# Patient Record
Sex: MALE | Race: OTHER | HISPANIC OR LATINO
[De-identification: names, ages, dates, MRNs, and addresses within clinical notes are randomized per-mention and may not be internally consistent; named-entity substitution may affect disease eponyms.]

---

## 2024-01-08 PROBLEM — Z00.00 ENCOUNTER FOR PREVENTIVE HEALTH EXAMINATION: Status: ACTIVE | Noted: 2024-01-08

## 2024-01-30 ENCOUNTER — NON-APPOINTMENT (OUTPATIENT)
Age: 34
End: 2024-01-30

## 2024-01-30 ENCOUNTER — APPOINTMENT (OUTPATIENT)
Dept: NEUROLOGY | Facility: CLINIC | Age: 34
End: 2024-01-30
Payer: COMMERCIAL

## 2024-01-30 ENCOUNTER — TRANSCRIPTION ENCOUNTER (OUTPATIENT)
Age: 34
End: 2024-01-30

## 2024-01-30 VITALS
BODY MASS INDEX: 34.86 KG/M2 | HEART RATE: 54 BPM | WEIGHT: 230 LBS | SYSTOLIC BLOOD PRESSURE: 112 MMHG | HEIGHT: 68 IN | DIASTOLIC BLOOD PRESSURE: 73 MMHG

## 2024-01-30 DIAGNOSIS — E55.9 VITAMIN D DEFICIENCY, UNSPECIFIED: ICD-10-CM

## 2024-01-30 DIAGNOSIS — Z84.1 FAMILY HISTORY OF DISORDERS OF KIDNEY AND URETER: ICD-10-CM

## 2024-01-30 DIAGNOSIS — Z82.61 FAMILY HISTORY OF ARTHRITIS: ICD-10-CM

## 2024-01-30 PROCEDURE — 99204 OFFICE O/P NEW MOD 45 MIN: CPT

## 2024-02-05 PROBLEM — Z82.61 FAMILY HISTORY OF RHEUMATOID ARTHRITIS: Status: ACTIVE | Noted: 2024-02-05

## 2024-02-05 LAB
25(OH)D3 SERPL-MCNC: 15 NG/ML
ALBUMIN SERPL ELPH-MCNC: 4.8 G/DL
ALP BLD-CCNC: 67 U/L
ALT SERPL-CCNC: 69 U/L
ANION GAP SERPL CALC-SCNC: 10 MMOL/L
AST SERPL-CCNC: 50 U/L
BILIRUB SERPL-MCNC: 0.3 MG/DL
BUN SERPL-MCNC: 12 MG/DL
CALCIUM SERPL-MCNC: 9.7 MG/DL
CHLORIDE SERPL-SCNC: 101 MMOL/L
CO2 SERPL-SCNC: 26 MMOL/L
CREAT SERPL-MCNC: 1.35 MG/DL
EGFR: 72 ML/MIN/1.73M2
GLUCOSE SERPL-MCNC: 81 MG/DL
HCT VFR BLD CALC: 36.9 %
HGB BLD-MCNC: 11.5 G/DL
MCHC RBC-ENTMCNC: 27.4 PG
MCHC RBC-ENTMCNC: 31.2 GM/DL
MCV RBC AUTO: 87.9 FL
PLATELET # BLD AUTO: 247 K/UL
POTASSIUM SERPL-SCNC: 4.4 MMOL/L
PROT SERPL-MCNC: 8.1 G/DL
RBC # BLD: 4.2 M/UL
RBC # FLD: 15 %
SODIUM SERPL-SCNC: 138 MMOL/L
TSH SERPL-ACNC: 1.96 UIU/ML
VIT B12 SERPL-MCNC: 419 PG/ML
WBC # FLD AUTO: 6.34 K/UL

## 2024-02-05 RX ORDER — ERGOCALCIFEROL 1.25 MG/1
1.25 MG CAPSULE, LIQUID FILLED ORAL
Qty: 6 | Refills: 0 | Status: ACTIVE | COMMUNITY
Start: 2024-02-05 | End: 1900-01-01

## 2024-02-05 RX ORDER — ZONISAMIDE 100 MG/1
100 CAPSULE ORAL
Qty: 30 | Refills: 0 | Status: DISCONTINUED | COMMUNITY
Start: 2024-02-05 | End: 2024-02-05

## 2024-02-05 NOTE — DISCUSSION/SUMMARY
[FreeTextEntry1] : Jeff is a pleasant 33-year-old man with a history of epilepsy. Not on AEDs due to side-effects. Last seizure one year ago. Estimates ~ 10 seizure in lifetime. With some seizures he just stares and with some he loses consciousness. Aura is dizziness and ? double vision. Discussed in detail seizure safety and that CBD and medical marijuana are not enough to protect him from another seizure. 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 Could not tolerate levetiracetam (mood swings, lethargy). Could not tolerate oxcarbazepine (dizziness). Could not tolerate lacosamide (has slow heart rate at baseline).

## 2024-02-05 NOTE — HISTORY OF PRESENT ILLNESS
[FreeTextEntry1] : Jeff is a 33-year-old right-handed man with a history of epilepsy. Notes are sparse.  He previously followed with Dr. Rajan Perez. As per notes, he was seen on 1/30/2024. Dr. Perez recommend he take levetiracetam 500 mg every 12 hours. He stopped medication in August due to mood swings and drowsiness. Drives. Last seizure was one year ago.   Per Jeff's report, first seizure was at age 18. He was on the computer, he suddenly felt dizzy and lost consciousnesses. He has muscle breakdown and is sometimes confused when he wakes up. With some seizures, he just stares.  Not sure if he has focal or generalized epilepsy. No tongue bite.  Unclear if tonic clonic movements.  Rarely, he gets a warning, he gets double vision and feels light-headed.   No history of kidney stones.   Overall, he thinks he has had over 10 seizures. With many, he loses consciousness.   In 2021, he had an EEG at [facility] which was normal.   No history of daniel vu.   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   Prior neurologist: Dr. Martinez, Dr. Rajan Perez in [place], [place]

## 2024-02-05 NOTE — ASSESSMENT
[FreeTextEntry1] : Please get labwork After I review labwork, we will start medication (zonegran). 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 Please visit Epilepsy [employer] : [email or web address].com/.

## 2024-02-05 NOTE — PHYSICAL EXAM
[FreeTextEntry1] : Mental Status: knows month, day, year, knows day of the week. $1.50 = 6, able to do serial seven. "dlrow". 3/3 registration of 3 items, 3/3 recall at  three minutes. Slightly flattened affect Language/Speech : speech fluent, can name, can repeat  Cranial Nerves  II: Pupils equal and reactive,  VFF full, corneal discoloration, lateral aspect right eye  III, IV, VI: EOMI, slight left exophoria V : normal sensation in V1-V3 b/l VII : mild right NLF VIII: normal hearing to voice  IX, X: normal palatal elevation, uvula midline XI: 5/5 head turn and 5/5 shoulder shrug bilaterally XII : midline tongue protrusion Motor: no drift in limbs, normal bulk and tone, 5/5 in all extremities Sensory: normal to light touch and vibration  Reflexes: brachioradialis, biceps trace b/l, triceps 2+, patella 2+ and ankles 1+ bilaterally Toes are down-going  Coordination: Normal finger to nose Gait: normal balance and gait

## 2024-02-23 ENCOUNTER — APPOINTMENT (OUTPATIENT)
Dept: NEUROLOGY | Facility: CLINIC | Age: 34
End: 2024-02-23
Payer: COMMERCIAL

## 2024-02-23 ENCOUNTER — NON-APPOINTMENT (OUTPATIENT)
Age: 34
End: 2024-02-23

## 2024-02-23 PROCEDURE — 95819 EEG AWAKE AND ASLEEP: CPT

## 2024-02-23 RX ORDER — OXCARBAZEPINE 300 MG/1
300 TABLET ORAL DAILY
Qty: 30 | Refills: 0 | Status: ACTIVE | COMMUNITY
Start: 2024-02-23 | End: 1900-01-01

## 2024-02-24 PROCEDURE — 95719 EEG PHYS/QHP EA INCR W/O VID: CPT

## 2024-02-24 PROCEDURE — 95708 EEG WO VID EA 12-26HR UNMNTR: CPT

## 2024-02-24 PROCEDURE — 95700 EEG CONT REC W/VID EEG TECH: CPT

## 2024-02-27 ENCOUNTER — APPOINTMENT (OUTPATIENT)
Dept: NEUROLOGY | Facility: CLINIC | Age: 34
End: 2024-02-27

## 2024-03-19 ENCOUNTER — APPOINTMENT (OUTPATIENT)
Dept: NEUROLOGY | Facility: CLINIC | Age: 34
End: 2024-03-19
Payer: COMMERCIAL

## 2024-03-19 VITALS
HEART RATE: 61 BPM | DIASTOLIC BLOOD PRESSURE: 83 MMHG | SYSTOLIC BLOOD PRESSURE: 122 MMHG | HEIGHT: 68 IN | WEIGHT: 230 LBS | OXYGEN SATURATION: 98 % | BODY MASS INDEX: 34.86 KG/M2

## 2024-03-19 DIAGNOSIS — Z87.898 PERSONAL HISTORY OF OTHER SPECIFIED CONDITIONS: ICD-10-CM

## 2024-03-19 PROCEDURE — 99215 OFFICE O/P EST HI 40 MIN: CPT

## 2024-03-19 RX ORDER — ZONISAMIDE 100 MG/1
100 CAPSULE ORAL
Qty: 30 | Refills: 0 | Status: DISCONTINUED | COMMUNITY
Start: 2024-02-05 | End: 2024-03-19

## 2024-03-19 RX ORDER — LAMOTRIGINE 25 MG/1
25 TABLET ORAL
Qty: 120 | Refills: 1 | Status: ACTIVE | COMMUNITY
Start: 2024-03-19 | End: 1900-01-01

## 2024-03-19 NOTE — PHYSICAL EXAM
[FreeTextEntry1] : Mental Status: Gives detailed history. Slightly flattened affect Cranial Nerves  II: Pupils equal and reactive,  VFF full, corneal discoloration, lateral aspect right eye  III, IV, VI: EOMI, slight left exophoria V : normal sensation in V1-V3 b/l VII : mild right NLF VIII: normal hearing to voice  IX, X: normal palatal elevation, uvula midline XI: 5/5 head turn and 5/5 shoulder shrug bilaterally XII : midline tongue protrusion Motor: no drift in limbs  Gait: normal balance and gait

## 2024-03-19 NOTE — DATA REVIEWED
[de-identified] : 2/23/24: Ambulatory EEG Rare bursts of rhythmic delta with admixed low amplitude spikes with broad field. Findings suggestive of generalized cortical hyper-excitability.  [de-identified] : 1/2024 creatinine 1.35, slightly elevated, elevated LFTs , hemoglobin 11.5 low, vitamin d low

## 2024-03-19 NOTE — ASSESSMENT
[FreeTextEntry1] : Please start lamotrigine. I will send to [facility] pharmacy. Week 1: lamotrigine 25 mg once a day Week 2: lamotrigine 25 mg every 12 hours  Week 3: lamotrigine 50 mg in the morning, 25 mg at night  Week 4: lamotrigine 50 mg every 12 hours - continue at this dose  Continue vitamin d Continue oxcarbazepine for now - in the future we can stop it Monitor for rash  Seizure Safety:   Wear a helmet when biking or horseback riding No unsupervised swimming Showers rather than baths - no bath alone - risk of drowning  Adjust the temperature on hot water heater to avoid scalding If uncontrolled seizures, use microwave rather than stovetop Don't lock door in bathroom, bedroom or on places  If fall off bed with seizures, try sleeping with mattress on the floor  Use soft or padded furniture  Avoid high ladders  Take medication as prescribed Follow driving regulations of people with epilepsy.  Please visit Epilepsy Foundation : [email or web address]

## 2024-03-19 NOTE — DISCUSSION/SUMMARY
[FreeTextEntry1] :  Jeff is a pleasant 33-year-old man with a history of epilepsy. Not on AEDs due to side-effects. Last seizure one year ago. Estimates ~ 10 seizure in lifetime. With some seizures he just stares and with one he lost consciousness (age 18). Diagnosis is not definite and history is vague. 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 Ambulatory EEG with ? of gen spike and wave burst (single, but not definite).   Aura is dizziness and ? double vision. Discussed in detail seizure safety and that CBD and medical marijuana are not enough to protect him from another seizure. 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 Could not tolerate levetiracetam (mood swings, lethargy). Could not tolerate oxcarbazepine (dizziness). Could not tolerate lacosamide (has slow heart rate at baseline).  Can retry lamotrigine  ? of rash. 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

## 2024-03-19 NOTE — HISTORY OF PRESENT ILLNESS
[FreeTextEntry1] : Last seen in clinic on 1/20/24. Doing well. 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 Noted anemia on bloodwork. States this is chronic.  Reports one episode in life of losing consciousness. This was at age 18. Not sure if related to heat stroke.  No bilateral tonic clonic seizures. Episodes involve just staring followed by fatigue.  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   Prior Neurologists included Dr. Maria Luisa Naranjo at [facility] and Dr. Perez - [facility] in [place].  Admits to not being fully compliant on oxcarbazepine. States that it makes him dizzy.  Triggers are high stress and sleep deprivation.  Admits to partial compliance only with oxtellar. States he has word finding difficulties with this. Did have ? rash with lamotrigine - unclear.  Willing to try again as we are limited in options.   Medication  oxtellar  mg ER daily vitamin d 52907 units  medical marijuana  _______________________ 1/30/24 Jeff is a 33-year-old right-handed man with a history of epilepsy. Notes are sparse.  He previously followed with Dr. Rajan Perez. As per notes, he was seen on 1/30/2024. Dr. Perez recommend he take levetiracetam 500 mg every 12 hours. He stopped medication in August due to mood swings and drowsiness. Drives. Last seizure was one year ago.   Per Jeff's report, first seizure was at age 18. He was on the computer, he suddenly felt dizzy and lost consciousnesses. He has muscle breakdown and is sometimes confused when he wakes up. With some seizures, he just stares.  Not sure if he has focal or generalized epilepsy. No tongue bite.  Unclear if tonic clonic movements.  Rarely, he gets a warning, he gets double vision and feels light-headed.   No history of kidney stones.   Overall, he thinks he has had over 10 seizures. With many, he loses consciousness.   In 2021, he had an EEG at [facility] which was normal.   No history of daniel vu.   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   Prior neurologist: Dr. Martinez, Dr. Rajan Perez in [place], [place]

## 2024-06-07 ENCOUNTER — APPOINTMENT (OUTPATIENT)
Dept: NEUROLOGY | Facility: CLINIC | Age: 34
End: 2024-06-07

## 2025-08-28 ENCOUNTER — TRANSCRIPTION ENCOUNTER (OUTPATIENT)
Age: 35
End: 2025-08-28